# Patient Record
Sex: FEMALE | Race: WHITE | URBAN - METROPOLITAN AREA
[De-identification: names, ages, dates, MRNs, and addresses within clinical notes are randomized per-mention and may not be internally consistent; named-entity substitution may affect disease eponyms.]

---

## 2017-05-02 ENCOUNTER — OFFICE VISIT (OUTPATIENT)
Dept: FAMILY MEDICINE CLINIC | Facility: CLINIC | Age: 32
End: 2017-05-02

## 2017-05-02 VITALS
BODY MASS INDEX: 28.04 KG/M2 | HEIGHT: 62 IN | SYSTOLIC BLOOD PRESSURE: 118 MMHG | RESPIRATION RATE: 16 BRPM | WEIGHT: 152.38 LBS | TEMPERATURE: 98 F | HEART RATE: 64 BPM | DIASTOLIC BLOOD PRESSURE: 64 MMHG

## 2017-05-02 DIAGNOSIS — Z01.419 ENCOUNTER FOR GYNECOLOGICAL EXAMINATION WITHOUT ABNORMAL FINDING: Primary | ICD-10-CM

## 2017-05-02 PROCEDURE — 87625 HPV TYPES 16 & 18 ONLY: CPT | Performed by: FAMILY MEDICINE

## 2017-05-02 PROCEDURE — 87624 HPV HI-RISK TYP POOLED RSLT: CPT | Performed by: FAMILY MEDICINE

## 2017-05-02 PROCEDURE — 88175 CYTOPATH C/V AUTO FLUID REDO: CPT | Performed by: FAMILY MEDICINE

## 2017-05-02 PROCEDURE — 99395 PREV VISIT EST AGE 18-39: CPT | Performed by: FAMILY MEDICINE

## 2017-05-02 NOTE — PROGRESS NOTES
[facility] [place]  PROGRESS NOTE        HPI:   This is a 28year old female coming in for Patient presents with: Follow - Up: Needs note to return to work    HPI  Shyanne Hartley was born [birth year]. Bleeding has stopped. Slight brown discharge.   Had /64 mmHg  Pulse 64  Temp(Src) 98 °F (36.7 °C) (Temporal)  Resp 16  Ht 62\"  Wt 152 lb 6 oz  BMI 27.86 kg/m2 Estimated body mass index is 27.86 kg/(m^2) as calculated from the following:    Height as of this encounter: 62\".     Weight as of this enc

## 2017-05-02 NOTE — PATIENT INSTRUCTIONS
Ok to return to work   Call when cycle starts to be scheduled for iud insertion. Call with new issues.

## 2017-05-08 ENCOUNTER — TELEPHONE (OUTPATIENT)
Dept: FAMILY MEDICINE CLINIC | Facility: CLINIC | Age: 32
End: 2017-05-08

## 2017-05-09 NOTE — TELEPHONE ENCOUNTER
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     Prosper Romero, 05/08/2017, 7:33 PM

## 2017-05-10 NOTE — TELEPHONE ENCOUNTER
Patient notified of results and recommendations and expressed understanding.   Patient scheduled for colposcopy with Dr. Katelin Romero, 05/10/2017, 11:43 AM

## 2017-05-13 ENCOUNTER — OFFICE VISIT (OUTPATIENT)
Dept: FAMILY MEDICINE CLINIC | Facility: CLINIC | Age: 32
End: 2017-05-13

## 2017-05-13 VITALS
BODY MASS INDEX: 28.31 KG/M2 | RESPIRATION RATE: 12 BRPM | WEIGHT: 153.81 LBS | HEIGHT: 62 IN | HEART RATE: 96 BPM | SYSTOLIC BLOOD PRESSURE: 114 MMHG | TEMPERATURE: 99 F | DIASTOLIC BLOOD PRESSURE: 70 MMHG

## 2017-05-13 DIAGNOSIS — R87.619 ABNORMAL CERVICAL PAPANICOLAOU SMEAR, UNSPECIFIED ABNORMAL PAP FINDING: Primary | ICD-10-CM

## 2017-05-13 PROBLEM — O40.9XX0 POLYHYDRAMNIOS: Status: ACTIVE | Noted: 2017-01-23

## 2017-05-13 PROCEDURE — 81025 URINE PREGNANCY TEST: CPT | Performed by: FAMILY MEDICINE

## 2017-05-13 PROCEDURE — 88305 TISSUE EXAM BY PATHOLOGIST: CPT | Performed by: FAMILY MEDICINE

## 2017-05-13 PROCEDURE — 57454 BX/CURETT OF CERVIX W/SCOPE: CPT | Performed by: FAMILY MEDICINE

## 2017-05-13 NOTE — PROGRESS NOTES
HPI:    Patient ID: Ileana Merlin is a 28year old female. HPI    Review of Systems         No current outpatient prescriptions on file.   Allergies:  No Known Allergies         PHYSICAL EXAM:   Physical Exam   Constitutional: She appears well-developed an Abnormal cervical papanicolaou smear, unspecified abnormal pap finding  (primary encounter diagnosis)      Orders Placed This Encounter  Urine Preg Test    Meds This Visit:  No prescriptions requested or ordered in this encounter    Imaging & Referrals:  N

## 2017-05-22 ENCOUNTER — OFFICE VISIT (OUTPATIENT)
Dept: FAMILY MEDICINE CLINIC | Facility: CLINIC | Age: 32
End: 2017-05-22

## 2017-05-22 VITALS
RESPIRATION RATE: 16 BRPM | HEIGHT: 62 IN | WEIGHT: 156.81 LBS | DIASTOLIC BLOOD PRESSURE: 62 MMHG | HEART RATE: 100 BPM | BODY MASS INDEX: 28.86 KG/M2 | TEMPERATURE: 97 F | SYSTOLIC BLOOD PRESSURE: 104 MMHG

## 2017-05-22 DIAGNOSIS — B97.7 HIGH RISK HPV INFECTION: ICD-10-CM

## 2017-05-22 DIAGNOSIS — N76.0 ACUTE VAGINITIS: Primary | ICD-10-CM

## 2017-05-22 DIAGNOSIS — N87.0 CIN I (CERVICAL INTRAEPITHELIAL NEOPLASIA I): ICD-10-CM

## 2017-05-22 PROCEDURE — 99213 OFFICE O/P EST LOW 20 MIN: CPT | Performed by: FAMILY MEDICINE

## 2017-05-22 RX ORDER — METRONIDAZOLE 500 MG/1
500 TABLET ORAL 2 TIMES DAILY
Qty: 10 TABLET | Refills: 0 | Status: SHIPPED | OUTPATIENT
Start: 2017-05-22 | End: 2017-10-21

## 2017-05-22 RX ORDER — FLUCONAZOLE 150 MG/1
150 TABLET ORAL ONCE
Qty: 2 TABLET | Refills: 0 | Status: SHIPPED | OUTPATIENT
Start: 2017-05-22 | End: 2017-05-22

## 2017-05-22 NOTE — PROGRESS NOTES
[facility] [place]  PROGRESS NOTE        HPI:   This is a 28year old female coming in for Patient presents with:  Test Results: Review biopsy results    HPI to discuss her recent colposcopy. Discussed JAYCE-1. Discussed noncancerous diagnosis. dysplasia , CIN1, low grade squamous intraepithelial lesion,   (LGSIL). B- Cervical biopsy at 1 o'clock:   -Mild dysplasia , CIN1, low grade squamous intraepithelial lesion,   (LGSIL).    C- Cervical biopsy at 3 o'clock:   -Mild dysplasia , CIN1, low grad Smoking Status: Former Smoker                   Packs/Day: 0.00  Years:           Types: Cigarettes      Quit date: 05/02/2007    Smokeless Status: Never Used                        Alcohol Use: Yes           0.0 oz/week       0 Standard drinks or equivale ASSESSMENT AND PLAN:   Juan Schmitz was seen today for test results. Diagnoses and all orders for this visit:    Acute vaginitis   Clear etiology at this time. We will treat with metronidazole and Diflucan will defer cryotherapy until resolved.   Follow

## 2017-06-15 ENCOUNTER — OFFICE VISIT (OUTPATIENT)
Dept: FAMILY MEDICINE CLINIC | Facility: CLINIC | Age: 32
End: 2017-06-15

## 2017-06-15 VITALS
RESPIRATION RATE: 16 BRPM | SYSTOLIC BLOOD PRESSURE: 114 MMHG | BODY MASS INDEX: 28.59 KG/M2 | HEIGHT: 62 IN | TEMPERATURE: 98 F | DIASTOLIC BLOOD PRESSURE: 66 MMHG | WEIGHT: 155.38 LBS | HEART RATE: 88 BPM

## 2017-06-15 DIAGNOSIS — N87.0 CIN I (CERVICAL INTRAEPITHELIAL NEOPLASIA I): Primary | ICD-10-CM

## 2017-06-15 PROCEDURE — 57511 CRYOCAUTERY OF CERVIX: CPT | Performed by: FAMILY MEDICINE

## 2017-06-16 NOTE — PROCEDURES
.Cryotherapy to Cervix    Informed consent obtained, patient placed in dorsal lithotomy position. Colposcopic exam reviewed. Vaginal speculum inserted,  Cryotherapy applied to cervix centered a bout lesion x 3 minutes  with good iceball formation.    all

## 2017-06-17 ENCOUNTER — OFFICE VISIT (OUTPATIENT)
Dept: FAMILY MEDICINE CLINIC | Facility: CLINIC | Age: 32
End: 2017-06-17

## 2017-06-17 VITALS
HEIGHT: 62 IN | SYSTOLIC BLOOD PRESSURE: 104 MMHG | HEART RATE: 80 BPM | TEMPERATURE: 98 F | BODY MASS INDEX: 28.52 KG/M2 | DIASTOLIC BLOOD PRESSURE: 62 MMHG | WEIGHT: 155 LBS | RESPIRATION RATE: 16 BRPM

## 2017-06-17 DIAGNOSIS — N87.0 CIN I (CERVICAL INTRAEPITHELIAL NEOPLASIA I): Primary | ICD-10-CM

## 2017-06-17 NOTE — PROGRESS NOTES
[facility] [place]  PROGRESS NOTE        HPI:   This is a 28year old female coming in for Patient presents with:  Procedure Follow Up: Cryo on 6/15/17    HPI  Patient here for cryp follow up. Doing well. Discharge present. No pain.       Res -Rare superficial squames.  [facility] REMOVED]    Embedded Images     Clinical Information R87.619 Abnormal Cervical Papanicolaou Smear, Unspecified Abnormal Pap   Finding [FORMATTING REMOVED]    Gross Description A- Labeled with the patient's name, med Allergies:    No Known Allergies        Current Meds:    Current Outpatient Prescriptions:  metRONIDAZOLE 500 MG Oral Tab Take 1 tablet (500 mg total) by mouth 2 (two) times daily.  Disp: 10 tablet Rfl: 0      Counseling given: Not Answered         Prob Administered    TDAP                  03/27/2017      Most Recent Immunizations  Administered            Date(s) Administered    TDAP                  03/27/2017

## 2017-09-22 ENCOUNTER — TELEPHONE (OUTPATIENT)
Dept: FAMILY MEDICINE CLINIC | Facility: CLINIC | Age: 32
End: 2017-09-22

## 2017-09-22 NOTE — TELEPHONE ENCOUNTER
Patient had appt today with Dr Francisco Javier Tapia for a px and was a no show- called patient at listed number and rescheduled for Sat 10/21- patient said she forgot about appt and apologized- no show policy was explained

## 2017-10-21 ENCOUNTER — OFFICE VISIT (OUTPATIENT)
Dept: FAMILY MEDICINE CLINIC | Facility: CLINIC | Age: 32
End: 2017-10-21

## 2017-10-21 VITALS
BODY MASS INDEX: 27.34 KG/M2 | TEMPERATURE: 98 F | RESPIRATION RATE: 16 BRPM | SYSTOLIC BLOOD PRESSURE: 110 MMHG | HEART RATE: 84 BPM | DIASTOLIC BLOOD PRESSURE: 78 MMHG | WEIGHT: 152.38 LBS | HEIGHT: 62.5 IN

## 2017-10-21 DIAGNOSIS — B97.7 HIGH RISK HPV INFECTION: Primary | ICD-10-CM

## 2017-10-21 DIAGNOSIS — N84.1 CERVICAL POLYP: ICD-10-CM

## 2017-10-21 DIAGNOSIS — N87.0 CIN I (CERVICAL INTRAEPITHELIAL NEOPLASIA I): ICD-10-CM

## 2017-10-21 PROCEDURE — 88175 CYTOPATH C/V AUTO FLUID REDO: CPT | Performed by: FAMILY MEDICINE

## 2017-10-21 PROCEDURE — 99214 OFFICE O/P EST MOD 30 MIN: CPT | Performed by: FAMILY MEDICINE

## 2017-10-21 PROCEDURE — 87660 TRICHOMONAS VAGIN DIR PROBE: CPT | Performed by: FAMILY MEDICINE

## 2017-10-21 PROCEDURE — 87480 CANDIDA DNA DIR PROBE: CPT | Performed by: FAMILY MEDICINE

## 2017-10-21 PROCEDURE — 87510 GARDNER VAG DNA DIR PROBE: CPT | Performed by: FAMILY MEDICINE

## 2017-10-21 RX ORDER — INFLUENZA A VIRUS A/CHRISTCHURCH/16/2010 NIB-74 (H1N1) HEMAGGLUTININ ANTIGEN (PROPIOLACTONE INACTIVATED), INFLUENZA A VIRUS A/SWITZERLAND/9715293/2013, NIB-88 (H3N2) HEMAGGLUTININ ANTIGEN (PROPIOLACTONE INACTIVATED), INFLUENZA B VIRUS B/PHUKET/3073/2013 - WILD TYPE HEMAGGLUTININ ANTIGEN (PROPIOLACTONE INACTIVATED) 15; 15; 15 UG/.5ML; UG/.5ML; UG/.5ML
INJECTION, SUSPENSION INTRAMUSCULAR
Refills: 0 | COMMUNITY
Start: 2017-10-05

## 2017-10-21 NOTE — PROGRESS NOTES
[facility] [place]  PROGRESS NOTE        HPI:   This is a 33year old female coming in for Patient presents with:  Pelvic: pelvic exam    HPI she is here for follow-up of her abnormal Pap smear. She is undergone cryotherapy.   As well as colposc -Scant superficial strips of benign endocervical mucosa. -Rare superficial squames.  [facility] REMOVED]    Embedded Images     Clinical Information R87.619 Abnormal Cervical Papanicolaou Smear, Unspecified Abnormal Pap   Finding [FORMATTING REMOVED] Problem Relation Age of Onset   • Lipids Mother      Allergies:    No Known Allergies        Current Meds:    Current Outpatient Prescriptions:  FLUVIRIN Intramuscular Suspension ADM 0.5ML IM UTD Disp:  Rfl: 0   IRON OR Take 1 tablet by mouth.  Disp:  Rfl: Genitourinary:       Genitourinary Comments: Thick white discharge. Large mulilobulated cervical polyp, erythematous. Neurological: She is alert and oriented to person, place, and time. Skin: Skin is warm and dry. She is not diaphoretic.    Psychiatri

## 2017-10-24 ENCOUNTER — TELEPHONE (OUTPATIENT)
Dept: FAMILY MEDICINE CLINIC | Facility: CLINIC | Age: 32
End: 2017-10-24

## 2017-10-24 NOTE — TELEPHONE ENCOUNTER
----- Message from Veronica Mendoza MD sent at 10/24/2017  8:38 AM CDT -----  Normal vaginal discharge.      This is result of vaginitis / vaginosis DNA probe  Alexus Romero, 10/24/17, 4:02 PM

## 2017-10-24 NOTE — TELEPHONE ENCOUNTER
Patient notified of all results and recommendations and expressed understanding.   Patient advised pap results still pending    Elisa Romero, 10/24/17, 4:01 PM

## 2017-10-24 NOTE — TELEPHONE ENCOUNTER
----- Message from Damon Wilkinson MD sent at 10/24/2017  1:35 PM CDT -----  normal results, please notify patient.     This is HPV result  Car Romero, 10/24/17, 4:00 PM

## 2017-10-26 ENCOUNTER — TELEPHONE (OUTPATIENT)
Dept: FAMILY MEDICINE CLINIC | Facility: CLINIC | Age: 32
End: 2017-10-26

## 2017-10-26 NOTE — TELEPHONE ENCOUNTER
----- Message from Aaron Harvey MD sent at 10/26/2017  1:46 PM CDT -----  normal results, please notify patient.

## (undated) NOTE — MR AVS SNAPSHOT
[address]  [place] [address] [id number]  [phone]               Thank you for choosing us for your health care visit with Ke Almendarez MD.  We are glad to serve you and happy to provide you with this summary of yo Kit Expiration Date 7/31/2018 Date                  [email or web address]     Sign up for [employer], your secure online medical record. [employer] will allow you to access patient instructions from your recent visit,  view other health information, and more.  To sign up or

## (undated) NOTE — MR AVS SNAPSHOT
[address] [address] [id number] [phone]               Thank you for choosing us for your health care visit with Nilton Vallecillo MD.  We are glad to serve you and happy to provide you with this summary of yo [employer] [address] [place], [place] [address] for making healthy food choices  -   Enjoy your food, but eat less. Fully enjoy your food when eating. Don’t eat while distracted and slow down. Avoid over sized portions. Don’t eat while when you’re bored.

## (undated) NOTE — LETTER
1954 [address]  [phone]            October 30, 2017      Zoie Lynn  [address] [place] [address]      Dear Rafael Marie:     Our office has been trying to contact yo

## (undated) NOTE — MR AVS SNAPSHOT
[address]  [place] [address] [id number]  [phone]               Thank you for choosing us for your health care visit with Melinda Rocha MD.  We are glad to serve you and happy to provide you with this summary of yo Enter your [employer] Activation Code exactly as it appears below along with your Zip Code and Date of Birth to complete the sign-up process. If you do not sign up before the expiration date, you must request a new code.     Your unique [employer] Access Code: [address]

## (undated) NOTE — Clinical Note
Date: 5/2/2017    Patient Name: Tesha Aguilera          To Whom it may concern: This letter has been written at the patient's request. The above patient was seen at the [facility] for treatment of a medical condition.         The patient may r

## (undated) NOTE — MR AVS SNAPSHOT
[address] [place]  [place] [address] [id number] [phone]               Thank you for choosing us for your health care visit with Jannie Serrano MD.  We are glad to serve you and happy to provide you with this summary of yo information, go to [email or web address] and click on the Sign Up Now link in the [employer] box. Enter your [employer] Activation Code exactly as it appears below along with your Zip Code and Date of Birth to complete the sign-up process.  If you do